# Patient Record
Sex: FEMALE | Race: WHITE | NOT HISPANIC OR LATINO | Employment: OTHER | URBAN - METROPOLITAN AREA
[De-identification: names, ages, dates, MRNs, and addresses within clinical notes are randomized per-mention and may not be internally consistent; named-entity substitution may affect disease eponyms.]

---

## 2023-09-20 ENCOUNTER — DOCUMENTATION (OUTPATIENT)
Dept: PRIMARY CARE | Facility: CLINIC | Age: 83
End: 2023-09-20
Payer: MEDICARE

## 2023-10-30 ENCOUNTER — DOCUMENTATION (OUTPATIENT)
Dept: PRIMARY CARE | Facility: CLINIC | Age: 83
End: 2023-10-30
Payer: MEDICARE

## 2023-10-30 NOTE — PROGRESS NOTES
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 Patient's healthcare needs ar met with staff at [facility], appreciates follow up and does not feel the need to continue services with Case Management at this time. Will close case.

## 2023-11-08 ENCOUNTER — OFFICE VISIT (OUTPATIENT)
Dept: OTOLARYNGOLOGY | Facility: CLINIC | Age: 83
End: 2023-11-08
Payer: MEDICARE

## 2023-11-08 VITALS — HEIGHT: 62 IN | BODY MASS INDEX: 22.08 KG/M2 | WEIGHT: 120 LBS

## 2023-11-08 DIAGNOSIS — H90.0 CONDUCTIVE HEARING LOSS, BILATERAL: ICD-10-CM

## 2023-11-08 DIAGNOSIS — H61.23 BILATERAL IMPACTED CERUMEN: Primary | ICD-10-CM

## 2023-11-08 PROCEDURE — 1036F TOBACCO NON-USER: CPT | Performed by: OTOLARYNGOLOGY

## 2023-11-08 PROCEDURE — 1159F MED LIST DOCD IN RCRD: CPT | Performed by: OTOLARYNGOLOGY

## 2023-11-08 PROCEDURE — 99213 OFFICE O/P EST LOW 20 MIN: CPT | Performed by: OTOLARYNGOLOGY

## 2023-11-08 PROCEDURE — 69210 REMOVE IMPACTED EAR WAX UNI: CPT | Performed by: OTOLARYNGOLOGY

## 2023-11-08 RX ORDER — GABAPENTIN 100 MG/1
CAPSULE ORAL
COMMUNITY

## 2023-11-08 RX ORDER — MULTIVITAMIN
TABLET ORAL EVERY 12 HOURS
COMMUNITY

## 2023-11-08 NOTE — PROGRESS NOTES
"Chief Complaint   Patient presents with    Follow-up     EP(2/17/23 W/ KERRI)- EAR CK, IRRIGATED TODAY      HPI:  Naomi Richardson is a 83 y.o. female ( Naomi) who complains of blocked ears.  Ongoing for at least several weeks.  Does have a history of wax impactions.  Denies any pain, drainage, vertigo.  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  Doing well without any changes in her medical history    PMH:  Past Medical History:   Diagnosis Date    Other specified health status     No known health problems     Past Surgical History:   Procedure Laterality Date    OTHER SURGICAL HISTORY  10/07/2021    Cholecystectomy    OTHER SURGICAL HISTORY  04/13/2021    Gallbladder surgery         Medications:     Current Outpatient Medications:     calcium carbonate-vitamin D3 (Calcium 600 + D,3,) 600 mg-10 mcg (400 unit) tablet, every 12 hours., Disp: , Rfl:     gabapentin (Neurontin) 100 mg capsule, Take by mouth., Disp: , Rfl:      Allergies:  Allergies   Allergen Reactions    Erythromycin Unknown        ROS:  Review of systems normal unless stated otherwise in the HPI and/or PMH.    Physical Exam:  Height 1.575 m (5' 2\"), weight 54.4 kg (120 lb). Body mass index is 21.95 kg/m².     GENERAL APPEARANCE: Well developed and well nourished.  Alert and oriented in no acute distress.  Normal vocal quality.      HEAD/FACE: No erythema or edema or facial tenderness.  Normal facial nerve function bilaterally.    EAR:       EXTERNAL: Normal pinnas and wax removed by flushing       MIDDLE EAR: Tympanic membranes intact and mobile with normal landmarks.  Middle ear space appears well aerated.       TUBE STATUS: N/A       MASTOID CAVITY: N/A       HEARING: Gross hearing assessment is within normal limits.      NOSE:       VISUALIZED USING: Anterior rhinoscopy with headlight and nasal speculum.       DORSUM: Midline, nontraumatic appearance.       MUCOSA: Normal-appearing.       SECRETIONS: Normal.       SEPTUM: Midline and " nonobstructing.       INFERIOR TURBINATES: Normal.       MIDDLE TURBINATES/MEATUS: N/A       BLEEDING: N/A         ORAL CAVITY/PHARYNX:       TEETH: Adequate dentition.       TONGUE: No mass or lesion.  Normal mobility.       FLOOR OF MOUTH: No mass or lesion.       PALATE: Normal hard palate, soft palate, and uvula.       OROPHARYNX: Normal without mass or lesion.       BUCCAL MUCOSA/GBS: Normal without mass or lesion.       LIPS: Normal.    LARYNX/HYPOPHARYNX/NASOPHARYNX: N/A    NECK: No palpable masses or abnormal adenopathy.  Trachea is midline.    THYROID: No thyromegaly or palpable nodule.    SALIVARY GLANDS: Normal bilateral parotid and submandibular glands by inspection and palpation.    TMJ's: Normal.    NEURO: Cranial nerve exam grossly normal bilaterally.       Assessment/Plan   Naomi was seen today for follow-up.  Diagnoses and all orders for this visit:  Bilateral impacted cerumen (Primary)  Conductive hearing loss, bilateral     Hearing better post ear cleaning.  Follow up if symptoms worsen or fail to improve.     Galileo Miguel MD

## 2024-04-02 ENCOUNTER — OFFICE VISIT (OUTPATIENT)
Dept: OTOLARYNGOLOGY | Facility: CLINIC | Age: 84
End: 2024-04-02
Payer: MEDICARE

## 2024-04-02 VITALS — WEIGHT: 120 LBS | HEIGHT: 62 IN | BODY MASS INDEX: 22.08 KG/M2

## 2024-04-02 DIAGNOSIS — H90.0 CONDUCTIVE HEARING LOSS, BILATERAL: ICD-10-CM

## 2024-04-02 DIAGNOSIS — H61.23 BILATERAL IMPACTED CERUMEN: Primary | ICD-10-CM

## 2024-04-02 PROCEDURE — 1159F MED LIST DOCD IN RCRD: CPT | Performed by: OTOLARYNGOLOGY

## 2024-04-02 PROCEDURE — 69210 REMOVE IMPACTED EAR WAX UNI: CPT | Performed by: OTOLARYNGOLOGY

## 2024-04-02 PROCEDURE — 1036F TOBACCO NON-USER: CPT | Performed by: OTOLARYNGOLOGY

## 2024-04-02 PROCEDURE — 99213 OFFICE O/P EST LOW 20 MIN: CPT | Performed by: OTOLARYNGOLOGY

## 2024-04-02 NOTE — PROGRESS NOTES
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  Ongoing for at least several weeks.  Does have a history of wax impactions.  Denies any pain, drainage, vertigo.  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  Doing well without any changes in her medical history    PMH:  Past Medical History:   Diagnosis Date    Other specified health status     No known health problems     Past Surgical History:   Procedure Laterality Date    OTHER SURGICAL HISTORY  10/07/2021    Cholecystectomy    OTHER SURGICAL HISTORY  04/13/2021    Gallbladder surgery         Medications:     Current Outpatient Medications:     calcium carbonate-vitamin D3 (Calcium 600 + D,3,) 600 mg-10 mcg (400 unit) tablet, every 12 hours., Disp: , Rfl:     gabapentin (Neurontin) 100 mg capsule, Take by mouth., Disp: , Rfl:      Allergies:  Allergies   Allergen Reactions    Erythromycin Unknown        ROS:  Review of systems normal unless stated otherwise in the HPI and/or PMH.    Physical Exam:  Height 1.575 m (5' 2\"), weight 54.4 kg (120 lb). Body mass index is 21.95 kg/m².     GENERAL APPEARANCE: Well developed and well nourished.  Alert and oriented in no acute distress.  Normal vocal quality.      HEAD/FACE: No erythema or edema or facial tenderness.  Normal facial nerve function bilaterally.    EAR:       EXTERNAL: Normal pinnas and bilateral obstructive cerumen impaction was removed by myself with instrumentation using the operating microscope.  Normal pinnas and external auditory canals after cleaning.       MIDDLE EAR: Tympanic membranes intact and mobile with normal landmarks.  Middle ear space appears well aerated.       TUBE STATUS: N/A       MASTOID CAVITY: N/A       HEARING: Gross hearing assessment is within normal limits.      NOSE:       VISUALIZED USING: Anterior rhinoscopy with headlight and " nasal speculum.       DORSUM: Midline, nontraumatic appearance.       MUCOSA: Normal-appearing.       SECRETIONS: Normal.       SEPTUM: Midline and nonobstructing.       INFERIOR TURBINATES: Normal.       MIDDLE TURBINATES/MEATUS: N/A       BLEEDING: N/A         ORAL CAVITY/PHARYNX:       TEETH: Adequate dentition.       TONGUE: No mass or lesion.  Normal mobility.       FLOOR OF MOUTH: No mass or lesion.       PALATE: Normal hard palate, soft palate, and uvula.       OROPHARYNX: Normal without mass or lesion.       BUCCAL MUCOSA/GBS: Normal without mass or lesion.       LIPS: Normal.    LARYNX/HYPOPHARYNX/NASOPHARYNX: N/A    NECK: No palpable masses or abnormal adenopathy.  Trachea is midline.    THYROID: No thyromegaly or palpable nodule.    SALIVARY GLANDS: Normal bilateral parotid and submandibular glands by inspection and palpation.    TMJ's: Normal.    NEURO: Cranial nerve exam grossly normal bilaterally.       Assessment/Plan   Naomi was seen today for follow-up.  Diagnoses and all orders for this visit:  Bilateral impacted cerumen (Primary)  Conductive hearing loss, bilateral       Hearing better post ear cleaning.  Follow-up with changes  Follow up in about 4 months (around 8/2/2024) for Recheck.     Galileo Miguel MD

## 2024-04-15 ENCOUNTER — LAB (OUTPATIENT)
Dept: LAB | Facility: LAB | Age: 84
End: 2024-04-15
Payer: MEDICARE

## 2024-04-15 ENCOUNTER — OFFICE VISIT (OUTPATIENT)
Dept: PRIMARY CARE | Facility: CLINIC | Age: 84
End: 2024-04-15
Payer: MEDICARE

## 2024-04-15 ENCOUNTER — HOSPITAL ENCOUNTER (OUTPATIENT)
Dept: RADIOLOGY | Facility: CLINIC | Age: 84
Discharge: HOME | End: 2024-04-15
Payer: MEDICARE

## 2024-04-15 VITALS
OXYGEN SATURATION: 93 % | HEART RATE: 71 BPM | SYSTOLIC BLOOD PRESSURE: 112 MMHG | DIASTOLIC BLOOD PRESSURE: 60 MMHG | WEIGHT: 119 LBS | BODY MASS INDEX: 21.77 KG/M2 | TEMPERATURE: 97.4 F

## 2024-04-15 DIAGNOSIS — M54.50 ACUTE LEFT-SIDED LOW BACK PAIN WITHOUT SCIATICA: Primary | ICD-10-CM

## 2024-04-15 DIAGNOSIS — R53.83 MALAISE AND FATIGUE: ICD-10-CM

## 2024-04-15 DIAGNOSIS — M54.50 ACUTE LEFT-SIDED LOW BACK PAIN WITHOUT SCIATICA: ICD-10-CM

## 2024-04-15 DIAGNOSIS — R53.81 MALAISE AND FATIGUE: ICD-10-CM

## 2024-04-15 LAB
ALBUMIN SERPL-MCNC: 4.2 G/DL (ref 3.5–5)
ALP BLD-CCNC: 68 U/L (ref 35–125)
ALT SERPL-CCNC: 23 U/L (ref 5–40)
ANION GAP SERPL CALC-SCNC: 12 MMOL/L
APPEARANCE UR: CLEAR
AST SERPL-CCNC: 27 U/L (ref 5–40)
BASOPHILS # BLD MANUAL: 0 X10*3/UL (ref 0–0.1)
BASOPHILS NFR BLD MANUAL: 0 %
BILIRUB SERPL-MCNC: 0.4 MG/DL (ref 0.1–1.2)
BILIRUB UR STRIP.AUTO-MCNC: NEGATIVE MG/DL
BUN SERPL-MCNC: 19 MG/DL (ref 8–25)
CALCIUM SERPL-MCNC: 10.2 MG/DL (ref 8.5–10.4)
CHLORIDE SERPL-SCNC: 99 MMOL/L (ref 97–107)
CO2 SERPL-SCNC: 29 MMOL/L (ref 24–31)
COLOR UR: YELLOW
CREAT SERPL-MCNC: 0.9 MG/DL (ref 0.4–1.6)
EGFRCR SERPLBLD CKD-EPI 2021: 64 ML/MIN/1.73M*2
EOSINOPHIL # BLD MANUAL: 0 X10*3/UL (ref 0–0.4)
EOSINOPHIL NFR BLD MANUAL: 0 %
ERYTHROCYTE [DISTWIDTH] IN BLOOD BY AUTOMATED COUNT: 11.9 % (ref 11.5–14.5)
GLUCOSE SERPL-MCNC: 96 MG/DL (ref 65–99)
GLUCOSE UR STRIP.AUTO-MCNC: NORMAL MG/DL
HCT VFR BLD AUTO: 41 % (ref 36–46)
HGB BLD-MCNC: 13.5 G/DL (ref 12–16)
IMM GRANULOCYTES # BLD AUTO: 0.01 X10*3/UL (ref 0–0.5)
IMM GRANULOCYTES NFR BLD AUTO: 0.2 % (ref 0–0.9)
KETONES UR STRIP.AUTO-MCNC: NEGATIVE MG/DL
LEUKOCYTE ESTERASE UR QL STRIP.AUTO: NEGATIVE
LYMPHOCYTES # BLD MANUAL: 1.16 X10*3/UL (ref 0.8–3)
LYMPHOCYTES NFR BLD MANUAL: 27 %
MCH RBC QN AUTO: 32.1 PG (ref 26–34)
MCHC RBC AUTO-ENTMCNC: 32.9 G/DL (ref 32–36)
MCV RBC AUTO: 97 FL (ref 80–100)
MONOCYTES # BLD MANUAL: 0.17 X10*3/UL (ref 0.05–0.8)
MONOCYTES NFR BLD MANUAL: 4 %
NEUTROPHILS # BLD MANUAL: 2.93 X10*3/UL (ref 1.6–5.5)
NEUTS BAND # BLD MANUAL: 0.09 X10*3/UL (ref 0–0.5)
NEUTS BAND NFR BLD MANUAL: 2 %
NEUTS SEG # BLD MANUAL: 2.84 X10*3/UL (ref 1.6–5)
NEUTS SEG NFR BLD MANUAL: 66 %
NITRITE UR QL STRIP.AUTO: NEGATIVE
NRBC BLD-RTO: 0 /100 WBCS (ref 0–0)
PH UR STRIP.AUTO: 5.5 [PH]
PLATELET # BLD AUTO: 193 X10*3/UL (ref 150–450)
POTASSIUM SERPL-SCNC: 4.3 MMOL/L (ref 3.4–5.1)
PROT SERPL-MCNC: 6.4 G/DL (ref 5.9–7.9)
PROT UR STRIP.AUTO-MCNC: NEGATIVE MG/DL
RBC # BLD AUTO: 4.21 X10*6/UL (ref 4–5.2)
RBC # UR STRIP.AUTO: NEGATIVE /UL
RBC MORPH BLD: NORMAL
SODIUM SERPL-SCNC: 140 MMOL/L (ref 133–145)
SP GR UR STRIP.AUTO: 1.02
TOTAL CELLS COUNTED BLD: 100
TSH SERPL DL<=0.05 MIU/L-ACNC: 1.29 MIU/L (ref 0.27–4.2)
UROBILINOGEN UR STRIP.AUTO-MCNC: NORMAL MG/DL
VARIANT LYMPHS # BLD MANUAL: 0.04 X10*3/UL (ref 0–0.3)
VARIANT LYMPHS NFR BLD: 1 %
VIT B12 SERPL-MCNC: 1831 PG/ML (ref 211–946)
WBC # BLD AUTO: 4.3 X10*3/UL (ref 4.4–11.3)

## 2024-04-15 PROCEDURE — 1159F MED LIST DOCD IN RCRD: CPT | Performed by: INTERNAL MEDICINE

## 2024-04-15 PROCEDURE — 1123F ACP DISCUSS/DSCN MKR DOCD: CPT | Performed by: INTERNAL MEDICINE

## 2024-04-15 PROCEDURE — 36415 COLL VENOUS BLD VENIPUNCTURE: CPT

## 2024-04-15 PROCEDURE — 80053 COMPREHEN METABOLIC PANEL: CPT

## 2024-04-15 PROCEDURE — 99214 OFFICE O/P EST MOD 30 MIN: CPT | Performed by: INTERNAL MEDICINE

## 2024-04-15 PROCEDURE — 82607 VITAMIN B-12: CPT

## 2024-04-15 PROCEDURE — 72110 X-RAY EXAM L-2 SPINE 4/>VWS: CPT

## 2024-04-15 PROCEDURE — 85007 BL SMEAR W/DIFF WBC COUNT: CPT

## 2024-04-15 PROCEDURE — 81003 URINALYSIS AUTO W/O SCOPE: CPT

## 2024-04-15 PROCEDURE — 85027 COMPLETE CBC AUTOMATED: CPT

## 2024-04-15 PROCEDURE — 84443 ASSAY THYROID STIM HORMONE: CPT

## 2024-04-15 PROCEDURE — 72110 X-RAY EXAM L-2 SPINE 4/>VWS: CPT | Performed by: RADIOLOGY

## 2024-04-15 PROCEDURE — 1158F ADVNC CARE PLAN TLK DOCD: CPT | Performed by: INTERNAL MEDICINE

## 2024-04-15 PROCEDURE — 1036F TOBACCO NON-USER: CPT | Performed by: INTERNAL MEDICINE

## 2024-04-15 PROCEDURE — 1126F AMNT PAIN NOTED NONE PRSNT: CPT | Performed by: INTERNAL MEDICINE

## 2024-04-15 RX ORDER — FERROUS SULFATE 325(65) MG
325 TABLET ORAL
COMMUNITY

## 2024-04-15 ASSESSMENT — ENCOUNTER SYMPTOMS
BACK PAIN: 1
BOWEL INCONTINENCE: 0
TINGLING: 0
FEVER: 0
NUMBNESS: 0
FATIGUE: 1
DYSURIA: 0
WEAKNESS: 0
PARESTHESIAS: 0
ABDOMINAL PAIN: 0

## 2024-04-15 ASSESSMENT — PATIENT HEALTH QUESTIONNAIRE - PHQ9
SUM OF ALL RESPONSES TO PHQ9 QUESTIONS 1 AND 2: 0
2. FEELING DOWN, DEPRESSED OR HOPELESS: NOT AT ALL
1. LITTLE INTEREST OR PLEASURE IN DOING THINGS: NOT AT ALL

## 2024-04-15 ASSESSMENT — PAIN SCALES - GENERAL: PAINLEVEL: 0-NO PAIN

## 2024-04-15 NOTE — PROGRESS NOTES
Subjective   Patient ID: Naomi Richardson is a 84 y.o. female who presents for Back Pain.    Back Pain  This is a new problem. The current episode started 1 to 4 weeks ago. The problem occurs intermittently. The problem is unchanged. The pain is present in the gluteal. The quality of the pain is described as aching. The pain does not radiate. The pain is The same all the time. The symptoms are aggravated by bending and position. Stiffness is present: when bending/kneeling to pray. Pertinent negatives include no abdominal pain, bladder incontinence, bowel incontinence, dysuria, fever, numbness, paresthesias, pelvic pain, tingling or weakness. She has tried nothing for the symptoms.        Review of Systems   Constitutional:  Positive for fatigue. Negative for fever.   Gastrointestinal:  Negative for abdominal pain and bowel incontinence.   Genitourinary:  Negative for bladder incontinence, dysuria and pelvic pain.   Musculoskeletal:  Positive for back pain.   Neurological:  Negative for tingling, weakness, numbness and paresthesias.       Objective   /60   Pulse 71   Temp 36.3 °C (97.4 °F)   Wt 54 kg (119 lb)   SpO2 93%   BMI 21.77 kg/m²     Physical Exam  Vitals reviewed.   Constitutional:       General: She is not in acute distress.     Appearance: Normal appearance. She is normal weight. She is not ill-appearing.   Cardiovascular:      Rate and Rhythm: Normal rate and regular rhythm.      Heart sounds: Normal heart sounds.   Pulmonary:      Effort: Pulmonary effort is normal.      Breath sounds: Normal breath sounds.   Abdominal:      General: Abdomen is flat. Bowel sounds are normal.      Palpations: Abdomen is soft.      Tenderness: There is no abdominal tenderness. There is left CVA tenderness.   Musculoskeletal:      Lumbar back: No swelling, edema, deformity, spasms, tenderness or bony tenderness. Normal range of motion.   Neurological:      General: No focal deficit present.      Mental Status: She  is alert and oriented to person, place, and time.   Psychiatric:         Mood and Affect: Mood normal.         Assessment/Plan   Diagnoses and all orders for this visit:  Acute left-sided low back pain without sciatica  -     Urinalysis with Reflex Culture and Microscopic; Future  -     XR lumbar spine complete 4+ views; Future  Malaise and fatigue  -     CBC and Auto Differential; Future  -     Comprehensive metabolic panel; Future  -     TSH; Future  -     Vitamin B12; Future  -     Urinalysis with Reflex Culture and Microscopic; Future

## 2024-04-18 ENCOUNTER — TELEPHONE (OUTPATIENT)
Dept: PRIMARY CARE | Facility: CLINIC | Age: 84
End: 2024-04-18
Payer: MEDICARE

## 2024-04-18 NOTE — TELEPHONE ENCOUNTER
Pt returned our call regarding her test results. Pt stated she will be home until 3 pm. Please call her.

## 2024-09-09 DIAGNOSIS — R20.2 PARESTHESIA OF SKIN: ICD-10-CM

## 2024-09-09 RX ORDER — GABAPENTIN 100 MG/1
100 CAPSULE ORAL 2 TIMES DAILY
Qty: 180 CAPSULE | Refills: 3 | Status: SHIPPED | OUTPATIENT
Start: 2024-09-09

## 2024-09-11 DIAGNOSIS — R20.2 PARESTHESIA OF SKIN: ICD-10-CM

## 2024-09-11 RX ORDER — GABAPENTIN 100 MG/1
100 CAPSULE ORAL 2 TIMES DAILY
Qty: 180 CAPSULE | Refills: 3 | OUTPATIENT
Start: 2024-09-11

## 2024-09-11 NOTE — TELEPHONE ENCOUNTER
Patient needs rf on gabapentin - [facility] - asking if you need to see her soon? She is almost out of rx

## 2025-01-27 ENCOUNTER — ANCILLARY PROCEDURE (OUTPATIENT)
Dept: URGENT CARE | Age: 85
End: 2025-01-27
Payer: MEDICARE

## 2025-01-27 ENCOUNTER — OFFICE VISIT (OUTPATIENT)
Dept: URGENT CARE | Age: 85
End: 2025-01-27
Payer: MEDICARE

## 2025-01-27 VITALS
RESPIRATION RATE: 20 BRPM | OXYGEN SATURATION: 96 % | DIASTOLIC BLOOD PRESSURE: 72 MMHG | SYSTOLIC BLOOD PRESSURE: 120 MMHG | HEART RATE: 100 BPM | TEMPERATURE: 97.7 F

## 2025-01-27 DIAGNOSIS — S59.901A INJURY OF RIGHT ELBOW, INITIAL ENCOUNTER: ICD-10-CM

## 2025-01-27 DIAGNOSIS — L03.113 CELLULITIS OF RIGHT UPPER EXTREMITY: Primary | ICD-10-CM

## 2025-01-27 PROCEDURE — 73080 X-RAY EXAM OF ELBOW: CPT | Mod: RIGHT SIDE | Performed by: PHYSICIAN ASSISTANT

## 2025-01-27 RX ORDER — CEPHALEXIN 500 MG/1
500 CAPSULE ORAL 3 TIMES DAILY
Qty: 21 CAPSULE | Refills: 0 | Status: SHIPPED | OUTPATIENT
Start: 2025-01-27 | End: 2025-02-03

## 2025-01-27 RX ORDER — SULFAMETHOXAZOLE AND TRIMETHOPRIM 800; 160 MG/1; MG/1
1 TABLET ORAL 2 TIMES DAILY
Qty: 10 TABLET | Refills: 0 | Status: SHIPPED | OUTPATIENT
Start: 2025-01-27 | End: 2025-01-27

## 2025-01-27 RX ORDER — SULFAMETHOXAZOLE AND TRIMETHOPRIM 800; 160 MG/1; MG/1
1 TABLET ORAL 2 TIMES DAILY
Qty: 10 TABLET | Refills: 0 | Status: SHIPPED | OUTPATIENT
Start: 2025-01-27 | End: 2025-02-01

## 2025-01-27 RX ORDER — CEPHALEXIN 500 MG/1
500 CAPSULE ORAL 3 TIMES DAILY
Qty: 21 CAPSULE | Refills: 0 | Status: SHIPPED | OUTPATIENT
Start: 2025-01-27 | End: 2025-01-27

## 2025-01-27 NOTE — PROGRESS NOTES
Subjective   Patient ID: Naomi Richardson is a 85 y.o. female. They present today with a chief complaint of Injury (X 2 weeks, RT forearm, red. Slide on ice. ).    History of Present Illness  Patient is a very pleasant 85-year-old white female, no significant past medical history, presented to clinic with chief complaint of right arm swelling.  Patient is about 2 weeks ago she slipped on some ice onto her bottom and hit her right arm on the ground.  States she recovered without difficulty however over the past couple of days she noticed increasing pain redness and swelling associated with the right forearm.  She does endorse bumping the extremity on a wall causing a small superficial abrasion to the distal forearm.  She denies any fever or chills.  No streaking.  No nausea vomiting or diarrhea.  No further complaints at this time.  She has not tried anything at home for her symptoms.      Injury      Past Medical History  Allergies as of 01/27/2025 - Reviewed 01/27/2025   Allergen Reaction Noted    Erythromycin Unknown 11/08/2023       (Not in a hospital admission)         Past Medical History:   Diagnosis Date    Other specified health status     No known health problems       Past Surgical History:   Procedure Laterality Date    OTHER SURGICAL HISTORY  10/07/2021    Cholecystectomy    OTHER SURGICAL HISTORY  04/13/2021    Gallbladder surgery        reports that she has never smoked. She has never used smokeless tobacco.    Review of Systems  Review of Systems                               Objective    Vitals:    01/27/25 1136   BP: 120/72   BP Location: Left arm   Patient Position: Sitting   BP Cuff Size: Adult   Pulse: 100   Resp: 20   Temp: 36.5 °C (97.7 °F)   TempSrc: Temporal   SpO2: 96%     No LMP recorded.    Physical Exam  Constitutional: Alert, oriented, cooperative, in no acute distress. Appears well nourished and well hydrated.    Head: Normocephalic, atraumatic    Skin: There is erythema with associated  induration or warmth over the ulnar aspect of the entire right forearm.  There is a small superficial abrasion over the distal ulnar aspect of the forearm.  There is no drainage.  There is no streaking.  Is neurovascular intact distally with cap refill less than 2 seconds full  think and 2+ radial pulse.  Otherwise intact, dry skin. No lesions, rash, petechiae, or purpura.    Eyes: PERRL, EOMs intact, conjunctiva pink with no erythema or exudates. No scleral icterus. Eyelids without lesions.    ENT: Hearing grossly intact. No external deformities. Tympanic membranes intact with visible landmarks. Nares patent, mucus membranes moist. Dentition without lesions. Pharynx clear, uvula midline.    Neck: Trachea midline, no lymphadenopathy. No meningismus.     Pulmonary: Lungs clear to auscultation bilaterally with good chest wall excursion. No rales, rhonchi, or wheezing. No accessory muscle use or stridor.     Cardiac: Regular rate and rhythm. Normal S1 and S2 with no murmur, rub, or gallop. No JVD, carotids without bruits. 2+ DP and PT pulses.     Abdomen: Soft, nontender, normoactive bowel sounds. No palpable organomegaly or mass. No rebound or guarding. No CVA tenderness.     Genitourinary: Exam deferred.    Musculoskeletal: Full range of motion in extremities. No pain, edema, or deformities. Peripheral pulses full and equal. No cyanosis, or clubbing.     Neurological: Cranial nerves II through XII are grossly intact. Normal sensation, no weakness, no focal findings identified.     Psychiatric: Appropriate mood and affect. Calm.  Procedures    Point of Care Test & Imaging Results from this visit    No results found.    Diagnostic study results (if any) were reviewed by Denis Palomo PA-C.    Assessment/Plan   Allergies, medications, history, and pertinent labs/EKGs/Imaging reviewed by Denis Palomo PA-C.     Medical Decision Making  Patient was seen eval in the clinic with complaint of pain swelling  and redness associated with the right upper extremity.  On exam patient is nontoxic very well-appearing respect comfortably no acute distress.  Vital signs are stable, afebrile.  Chest is clear, heart is regular, belly soft and nontender peer evaluation of the right upper extremity as above concerning for a developing cellulitic process.  X-ray imaging of the elbow was obtained given history of trauma which reveals no underlying acute osseous abnormalities.  Will treat the patient for cellulitis of the right upper extremity with a 7-day course of cephalexin 100 mg 3 times a day along with a 5-day course of Bactrim DS twice daily.  Advised to follow very closely with the primary care physician in the next week.  Breast imaging report to the ED if the redness is worsening in any way.  Reviewed my impression, plan, strict return versus report to ED precautions with the patient.  She expresses understanding and agreement plan of care.    Orders and Diagnoses  Diagnoses and all orders for this visit:  Cellulitis of right upper extremity  -     cephalexin (Keflex) 500 mg capsule; Take 1 capsule (500 mg) by mouth 3 times a day for 7 days.  -     sulfamethoxazole-trimethoprim (Bactrim DS) 800-160 mg tablet; Take 1 tablet by mouth 2 times a day for 5 days.  Injury of right elbow, initial encounter  -     XR elbow right 3+ views; Future        Medical Admin Record      Follow Up Instructions  No follow-ups on file.    Patient disposition: Home    Electronically signed by Denis Palomo PA-C  12:05 PM

## 2025-01-27 NOTE — PATIENT INSTRUCTIONS
Get a probiotic when you  prescriptions from the pharmacy    Please report to the ED if symptoms continue to worsen in any way

## 2025-01-31 ENCOUNTER — HOSPITAL ENCOUNTER (EMERGENCY)
Facility: HOSPITAL | Age: 85
Discharge: HOME | End: 2025-01-31
Attending: EMERGENCY MEDICINE
Payer: MEDICARE

## 2025-01-31 ENCOUNTER — HOSPITAL ENCOUNTER (EMERGENCY)
Facility: HOSPITAL | Age: 85
Discharge: HOME | End: 2025-01-31
Attending: STUDENT IN AN ORGANIZED HEALTH CARE EDUCATION/TRAINING PROGRAM
Payer: MEDICARE

## 2025-01-31 VITALS
SYSTOLIC BLOOD PRESSURE: 96 MMHG | WEIGHT: 122 LBS | OXYGEN SATURATION: 96 % | BODY MASS INDEX: 21.62 KG/M2 | RESPIRATION RATE: 18 BRPM | DIASTOLIC BLOOD PRESSURE: 82 MMHG | TEMPERATURE: 97.7 F | HEART RATE: 89 BPM | HEIGHT: 63 IN

## 2025-01-31 VITALS
WEIGHT: 122 LBS | BODY MASS INDEX: 21.62 KG/M2 | HEIGHT: 63 IN | TEMPERATURE: 97.5 F | OXYGEN SATURATION: 96 % | SYSTOLIC BLOOD PRESSURE: 135 MMHG | HEART RATE: 84 BPM | DIASTOLIC BLOOD PRESSURE: 72 MMHG | RESPIRATION RATE: 18 BRPM

## 2025-01-31 DIAGNOSIS — R04.0 EPISTAXIS: Primary | ICD-10-CM

## 2025-01-31 PROCEDURE — 99282 EMERGENCY DEPT VISIT SF MDM: CPT | Performed by: EMERGENCY MEDICINE

## 2025-01-31 PROCEDURE — 30901 CONTROL OF NOSEBLEED: CPT | Mod: 50 | Performed by: STUDENT IN AN ORGANIZED HEALTH CARE EDUCATION/TRAINING PROGRAM

## 2025-01-31 PROCEDURE — 2500000005 HC RX 250 GENERAL PHARMACY W/O HCPCS: Performed by: STUDENT IN AN ORGANIZED HEALTH CARE EDUCATION/TRAINING PROGRAM

## 2025-01-31 PROCEDURE — 99282 EMERGENCY DEPT VISIT SF MDM: CPT | Performed by: STUDENT IN AN ORGANIZED HEALTH CARE EDUCATION/TRAINING PROGRAM

## 2025-01-31 PROCEDURE — 2500000001 HC RX 250 WO HCPCS SELF ADMINISTERED DRUGS (ALT 637 FOR MEDICARE OP): Performed by: EMERGENCY MEDICINE

## 2025-01-31 RX ORDER — OXYMETAZOLINE HCL 0.05 %
2 SPRAY, NON-AEROSOL (ML) NASAL ONCE
Status: COMPLETED | OUTPATIENT
Start: 2025-01-31 | End: 2025-01-31

## 2025-01-31 RX ORDER — SILVER NITRATE 38.21; 12.74 MG/1; MG/1
STICK TOPICAL ONCE
Status: COMPLETED | OUTPATIENT
Start: 2025-01-31 | End: 2025-01-31

## 2025-01-31 RX ADMIN — OXYMETAZOLINE HYDROCHLORIDE 2 SPRAY: 0.5 SPRAY NASAL at 10:34

## 2025-01-31 RX ADMIN — SILVER NITRATE APPLICATORS 1 APPLICATOR: 25; 75 STICK TOPICAL at 05:59

## 2025-01-31 ASSESSMENT — PAIN SCALES - GENERAL
PAINLEVEL_OUTOF10: 0 - NO PAIN
PAINLEVEL_OUTOF10: 0 - NO PAIN

## 2025-01-31 ASSESSMENT — PAIN - FUNCTIONAL ASSESSMENT
PAIN_FUNCTIONAL_ASSESSMENT: 0-10
PAIN_FUNCTIONAL_ASSESSMENT: 0-10

## 2025-01-31 ASSESSMENT — COLUMBIA-SUICIDE SEVERITY RATING SCALE - C-SSRS
6. HAVE YOU EVER DONE ANYTHING, STARTED TO DO ANYTHING, OR PREPARED TO DO ANYTHING TO END YOUR LIFE?: NO
1. IN THE PAST MONTH, HAVE YOU WISHED YOU WERE DEAD OR WISHED YOU COULD GO TO SLEEP AND NOT WAKE UP?: NO
2. HAVE YOU ACTUALLY HAD ANY THOUGHTS OF KILLING YOURSELF?: NO

## 2025-01-31 NOTE — ED TRIAGE NOTES
Patient arrived to the ED from home via personal vehicle with a chief complaint of epistaxis. Patient reports that she was here this am for same complaint. Patient ambulated independently into triage, A&Ox4.

## 2025-01-31 NOTE — PROGRESS NOTES
Attestation/Supervisory note for TIANNA Ackerman      The patient is an 85-year-old female presenting to the emergency department for evaluation of epistaxis.  The patient was reportedly seen in the emergency room early this morning because she had bleeding from both of her naris.  She reportedly had it cauterized by Dr. Kelley and was discharged to home with instructions to follow-up with ENT.  She reportedly went home and just prior to arrival she had some recurrence of the bleeding so she returned to the emergency room.  She states that she fell about 2 weeks ago but did not injure her nose.  She did not have any other injury or trauma at the time.  She does not have any headache or visual changes.  No chest pain or shortness of breath.  No abdominal pain.  No nausea vomiting.  No diarrhea or constipation but no urinary complaints.  No fever or chills.  No cough or congestion.  No focal weakness or numbness.  No use of blood thinners.  All pertinent positives and negatives are recorded above.  All other systems reviewed and otherwise negative.  Vital signs within normal limits.  Physical exam with a well-nourished well-developed female in no acute distress.  HEENT exam with a scant amount of blood in both of her nares but otherwise within normal limits.  She has no evidence of airway compromise or respiratory distress.  Abdominal exam is benign.  She does not have any gross motor, neurologic or vascular deficits on exam.  Pulses are equal bilaterally.      Afrin was instilled bilaterally and a nasal clamp was applied.  The patient was observed in the emergency room but did not have any recurrence of her bleeding.      The patient was released in good condition.  She will follow-up with her primary care physician as needed.  She will follow-up with her ENT within 1 to 2 days for further management of her current symptoms.  She will return to the emergency department sooner with worsening of symptoms or onset of new  symptoms.      Impression/diagnosis:  1.  Epistaxis, recurrent, bilateral      I personally saw the patient and made/approve the management plan and take responsibility for the patient management.      I personally discussed the patient's management with the patient      Zayda Thompson MD

## 2025-01-31 NOTE — DISCHARGE INSTRUCTIONS
Please use Afrin and the nasal clamps as needed for recurrent nosebleeds and use humidifiers and Vaseline to hydrate the nose.  Present back to ER if you develop any recurrent nosebleeds that you are unable to stop yourself with Afrin, nasal clamps and direct pressure.  Follow-up with the ENT specialist.    It is important to remember that your care does not end here and you must continue to monitor your condition closely. Please return to the emergency department for any worsening or concerning signs or symptoms as directed by our conversations and the discharge instructions. If you do not have a doctor please contact the referral number on your discharge instructions. Please contact any physician specialists provided in your discharge notes as it is very important to follow up with them regarding your condition. If you are unable to reach the physicians provided, please come back to the Emergency Department at any time.

## 2025-01-31 NOTE — DISCHARGE INSTRUCTIONS
Follow-up with your ear nose and throat doctor, you should call the office and make an appointment for further evaluation and management of nosebleeds.  Return to the ED for any nosebleed that you cannot get this time.  For acute management of nosebleeds you should pinch your nose, lean forward and hold pressure for 15 minutes without letting go.  Blow out any blood clots and see if you are still having nosebleed.  If you are you should repeat pinching off your nose and leaning forward for 15 minutes.  If this does not stop the bleeding you should return to the emergency department for further management.

## 2025-01-31 NOTE — ED PROVIDER NOTES
HPI   Chief Complaint   Patient presents with    Epistaxis (Nose Bleed)       HPI  Patient is an 85-year-old female presenting for evaluation of epistaxis.  Patient states that she was seen in the ER earlier for epistaxis and had both of her nostrils cauterized but states that the bleeding did start back up prior to coming into the ER this time.  She states the area is very dry where she lives and feels this is contributing to her nosebleeds.  She was instructed to come back to the ER if she has recurrence of nosebleeds.  She states she does have an ENT doctor but has not been able to follow-up since her previous nosebleed.  Denies any use of blood thinners.  Denies injury or trauma to the nose.  Otherwise has no acute complaints.      Patient History   Past Medical History:   Diagnosis Date    Other specified health status     No known health problems     Past Surgical History:   Procedure Laterality Date    OTHER SURGICAL HISTORY  10/07/2021    Cholecystectomy    OTHER SURGICAL HISTORY  04/13/2021    Gallbladder surgery     No family history on file.  Social History     Tobacco Use    Smoking status: Never    Smokeless tobacco: Never   Substance Use Topics    Alcohol use: Not on file    Drug use: Not on file       Physical Exam   ED Triage Vitals [01/31/25 1017]   Temperature Heart Rate Respirations BP   36.4 °C (97.5 °F) 93 18 131/78      Pulse Ox Temp Source Heart Rate Source Patient Position   97 % Temporal -- --      BP Location FiO2 (%)     -- --       Physical Exam  Vitals and nursing note reviewed.   Constitutional:       General: She is not in acute distress.  HENT:      Nose:      Comments: Excoriations to the medial aspect of bilateral nostrils with evidence of cauterization bilaterally, no active bleeding present     Mouth/Throat:      Comments: No active bleeding in the oropharynx  Neurological:      Mental Status: She is alert.           ED Course & MDM   ED Course as of 02/02/25 0631   Fri Jan 31,  2025 1217 Patient was able to ambulate, nosebleed has stopped and has not recurred during the visit.  Stable for discharge. [JJ]      ED Course User Index  [JJ] Sherrell Ackerman PA-C         Diagnoses as of 02/02/25 0631   Epistaxis                 No data recorded     Ritu Coma Scale Score: 15 (01/31/25 1035 : Pamela Roblero RN)                           Medical Decision Making  Parts of this chart have been completed using voice recognition software. Please excuse any errors of transcription.  My thought process and reason for plan has been formulated from the time that I saw the patient until the time of disposition and is not specific to one specific moment during their visit and furthermore my MDM encompasses this entire chart and not only this text box.      HPI: Detailed above.    Exam: A medically appropriate exam performed, outlined above, given the known history and presentation.    History obtained from: Patient    Medications given during visit:  Medications   oxymetazoline (Afrin) 0.05 % nasal spray 2 spray (2 sprays Each Nostril Given 1/31/25 1034)        Diagnostic/tests  Labs Reviewed - No data to display   No orders to display        Considerations/further MDM:  Patient is a 85-year-old female presenting for evaluation of epistaxis    Patient is well-appearing in no apparent distress during the visit.  HEENT exam is with a scant amount of blood in the medial aspect of anterior nares but no active bleeding is identified.  Afrin was instilled to the nares bilaterally and a nasal clamp was applied.  The patient was observed in the emergency room and was ambulated by nursing staff without recurrence of her epistaxis.  She is released in good condition with instruction to follow-up with primary care and also her ENT specialist for further management of her current symptoms.  Also encouraged rest, adequate hydration, humidifiers and Vaseline for hydration of the nostrils.  Home management of  epistaxis was discussed and patient was released with Afrin.  Released in good condition.      Procedure  Procedures     Sherrell Ackerman PA-C  02/02/25 0631

## 2025-01-31 NOTE — ED PROVIDER NOTES
HPI   Chief Complaint   Patient presents with    Epistaxis (Nose Bleed)     Pt present to the ED with c/o of nosebleed. Pt stated that she was on the toilet when she suddenly started bleeding from her nose. Pt states that she has a hx of nosebleed 2 months ago and she was able to stop it without coming to the hospital. Pt state she fell 2 weeks ago and was placed on keflex because her right arm was red.       This is an 85-year-old female presenting to the ED for evaluation of epistaxis.  She has a history of nosebleeds occurring a couple of times in the past 2 months since the heat went on.  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  She has used Vaseline a couple of times to try to keep the tissue moist but has not been using it recently.  She had an episode where her nose was bleeding for about 30 minutes this morning and she was able to get it to stop on her own so she came to the ED.  It has since stopped bleeding, she states she has never had a nosebleed lasting this long.  She denies any nasal trauma, any recent illness including cough or congestion, sore throat associated with her nosebleed.  She is not sure if the bleeding was out of 1 side or both sides this morning.      History provided by:  Patient   used: No            Patient History   Past Medical History:   Diagnosis Date    Other specified health status     No known health problems     Past Surgical History:   Procedure Laterality Date    OTHER SURGICAL HISTORY  10/07/2021    Cholecystectomy    OTHER SURGICAL HISTORY  04/13/2021    Gallbladder surgery     No family history on file.  Social History     Tobacco Use    Smoking status: Never    Smokeless tobacco: Never   Substance Use Topics    Alcohol use: Not on file    Drug use: Not on file       Physical Exam   ED Triage Vitals [01/31/25 0538]   Temperature Heart Rate Respirations BP   36.5 °C (97.7  °F) 89 18 96/82      Pulse Ox Temp Source Heart Rate Source Patient Position   96 % Oral Monitor --      BP Location FiO2 (%)     -- --       Physical Exam  General: well developed, well nourished 85-year-old female who is awake and alert, in no apparent distress  HENT: normocephalic, atraumatic. Pharynx without erythema or exudates, uvula midline.  Excoriations to the nasal septum on both sides, minimal on the right, larger on the left.  No active bleeding currently.  Psych: appropriate mood and affect, cooperative with exam  Skin: warm, dry, without evidence of rash or abrasions    ED Course & MDM   Diagnoses as of 01/31/25 0607   Epistaxis                 No data recorded     [place] Coma Scale Score: 15 (01/31/25 0538 : Ozzy Thomas RN)                           Medical Decision Making  She is in no acute distress in the ED.  She has excoriations to the nasal septum bilaterally, worse on the left side.  It is likely that this is due to the dry air in the place where she is living when he comes on since she has been having trouble with it on and off for the past couple of weeks and she has excoriations to both sides of the septum.  Verbal consent obtained for cauterization to prevent recurrent epistaxis.  Patient does have an ENT specialist that she sees, referred to follow-up as an outpatient after this procedure was performed today.  Advised to continue using Vaseline for hydration and is a protective barrier to prevent nosebleeds in the future when he comes on.  Patient tolerated the procedure well with no recurrence of bleeding.  She was discharged in improved condition.    Procedure  Epistaxis Management    Performed by: Denis Kelley DO  Authorized by: Denis Kelley DO    Consent:     Consent obtained:  Verbal    Consent given by:  Patient    Risks, benefits, and alternatives were discussed: yes      Risks discussed:  Bleeding, infection, nasal injury and pain    Alternatives discussed:   Delayed treatment, referral and observation  Universal protocol:     Procedure explained and questions answered to patient or proxy's satisfaction: yes      Relevant documents present and verified: yes      Test results available: yes      Imaging studies available: yes      Required blood products, implants, devices, and special equipment available: yes      Site/side marked: yes      Immediately prior to procedure, a time out was called: yes      Patient identity confirmed:  Verbally with patient  Anesthesia:     Anesthesia method:  None  Procedure details:     Treatment site:  R septum and L septum    Treatment method:  Silver nitrate    Treatment complexity:  Limited    Treatment episode: initial    Post-procedure details:     Assessment:  Bleeding stopped    Procedure completion:  Tolerated well, no immediate complications       Denis Kelley DO  01/31/25 0608

## 2025-02-04 ENCOUNTER — APPOINTMENT (OUTPATIENT)
Dept: OTOLARYNGOLOGY | Facility: CLINIC | Age: 85
End: 2025-02-04
Payer: MEDICARE

## 2025-02-07 ENCOUNTER — APPOINTMENT (OUTPATIENT)
Dept: OTOLARYNGOLOGY | Facility: CLINIC | Age: 85
End: 2025-02-07
Payer: MEDICARE

## 2025-02-10 ENCOUNTER — APPOINTMENT (OUTPATIENT)
Dept: OTOLARYNGOLOGY | Facility: CLINIC | Age: 85
End: 2025-02-10
Payer: MEDICARE

## 2025-02-10 DIAGNOSIS — H61.23 BILATERAL IMPACTED CERUMEN: ICD-10-CM

## 2025-02-10 DIAGNOSIS — H90.0 CONDUCTIVE HEARING LOSS, BILATERAL: ICD-10-CM

## 2025-02-10 DIAGNOSIS — H93.8X3 SENSATION OF FULLNESS IN BOTH EARS: ICD-10-CM

## 2025-02-10 DIAGNOSIS — R04.0 EPISTAXIS: Primary | ICD-10-CM

## 2025-02-10 PROCEDURE — 1159F MED LIST DOCD IN RCRD: CPT | Performed by: OTOLARYNGOLOGY

## 2025-02-10 PROCEDURE — 99214 OFFICE O/P EST MOD 30 MIN: CPT | Performed by: OTOLARYNGOLOGY

## 2025-02-10 PROCEDURE — 30903 CONTROL OF NOSEBLEED: CPT

## 2025-02-10 PROCEDURE — 69210 REMOVE IMPACTED EAR WAX UNI: CPT

## 2025-02-10 PROCEDURE — 1036F TOBACCO NON-USER: CPT | Performed by: OTOLARYNGOLOGY

## 2025-02-10 NOTE — PROGRESS NOTES
Chief Complaint   Patient presents with    Epistaxis (Nose Bleed)     LOV: 4/2024 NOSE BLEEDS, PACKING REMOVAL, NOT ON BLOOD THINNERS     HPI:  Naomi Richardson is a 85 y.o. female presents for follow-up after squad ride and ED treatment of EpiFix that with left-sided nasal balloon.  She had two emergency room visits for epistaxis also on 1/31/2025, they were able to control the bleeding with pressure no packing needed at those times.  She reports she has had many of the episodes of left-sided epistaxis since that [facility] [employer].  She denies blood thinner aspirin or ibuprofen use.  Also denies headaches  and vision changes. additional complaints today for bilateral ear fullness and decreased hearing, she reports she has an appointment for this next month    PMH:  Past Medical History:   Diagnosis Date    Other specified health status     No known health problems     Past Surgical History:   Procedure Laterality Date    OTHER SURGICAL HISTORY  10/07/2021    Cholecystectomy    OTHER SURGICAL HISTORY  04/13/2021    Gallbladder surgery         Medications:     Current Outpatient Medications:     calcium carbonate-vitamin D3 (Calcium 600 + D,3,) 600 mg-10 mcg (400 unit) tablet, every 12 hours., Disp: , Rfl:     ferrous sulfate, 325 mg ferrous sulfate, tablet, Take 1 tablet (325 mg) by mouth once daily with breakfast., Disp: , Rfl:     gabapentin (Neurontin) 100 mg capsule, TAKE 1 CAPSULE BY MOUTH TWICE A DAY, Disp: 180 capsule, Rfl: 3    multivitamin with minerals iron-free (multivit-iron-minerals-folic acid), Take 1 tablet by mouth once daily., Disp: , Rfl:      Allergies:  Allergies   Allergen Reactions    Erythromycin Unknown        ROS:  Review of systems normal unless stated otherwise in the HPI and/or PMH.    Physical Exam:       GENERAL APPEARANCE: Well developed and well nourished.  Alert and oriented in no acute distress.  Normal vocal quality.      HEAD/FACE: No erythema or edema or facial tenderness.   Normal facial nerve function bilaterally.    EAR:       EXTERNAL: Obstructing wax removed with suction and alligator underneath the microscope.  Normal pinnas and external auditory canals without lesion.        MIDDLE EAR: Tympanic membranes intact and mobile with normal landmarks.  Middle ear space appears well aerated.       TUBE STATUS: N/A       MASTOID CAVITY: N/A       HEARING: Gross hearing assessment is within normal limits.      NOSE:       VISUALIZED USING: Anterior rhinoscopy with headlight and nasal speculum.       DORSUM: Midline, nontraumatic appearance.       MUCOSA: Normal-appearing.       SECRETIONS: Normal.       SEPTUM: Midline and nonobstructing.       INFERIOR TURBINATES: Normal.       MIDDLE TURBINATES/MEATUS: N/A       BLEEDING: N/A         ORAL CAVITY/PHARYNX:       TEETH: Adequate dentition.       TONGUE: No mass or lesion.  Normal mobility.       FLOOR OF MOUTH: No mass or lesion.       PALATE: Normal hard palate, soft palate, and uvula.       OROPHARYNX: Normal without mass or lesion.       BUCCAL MUCOSA/GBS: Normal without mass or lesion.       LIPS: Normal.    LARYNX/HYPOPHARYNX/NASOPHARYNX: N/A    NECK: No palpable masses or abnormal adenopathy.  Trachea is midline.    THYROID: No thyromegaly or palpable nodule.    SALIVARY GLANDS: Normal bilateral parotid and submandibular glands by inspection and palpation.    TMJ's: Normal.    NEURO: Cranial nerve exam grossly normal bilaterally.     Procedure: Explained procedure, received verbal consent, Xylocaine and decongestant applied after epistaxis site identified with anterior rhinoscopy and headlight at left Kiesselbach's plexus. Cauterized vessel without difficulty patient tolerated well.      Assessment/Plan   Naomi was seen today for epistaxis (nose bleed).  Diagnoses and all orders for this visit:  Epistaxis (Primary)  Bilateral impacted cerumen  Sensation of fullness in both ears  Conductive hearing loss, bilateral     Discussed  epistaxis control at home.  Home-going instructions for post cauterization provided-no blowing, snorting, rubbing , picking or heavy lifting x 7 days.  I have asked her to use triple antibiotic and eraser size dollop to the left nares twice a day x 7 days, She will call with any epistaxis.  I provided this in writing. Reports hearing is better after impacted cerumen removed.  She will return in 6 months for an ear cleaning and cancel her existing appointment for next month   No follow-ups on file.     Marine Jaquez, APRN-CNP

## 2025-03-21 ENCOUNTER — APPOINTMENT (OUTPATIENT)
Dept: OTOLARYNGOLOGY | Facility: CLINIC | Age: 85
End: 2025-03-21
Payer: MEDICARE

## 2025-04-04 ENCOUNTER — OFFICE VISIT (OUTPATIENT)
Dept: PRIMARY CARE | Facility: CLINIC | Age: 85
End: 2025-04-04
Payer: MEDICARE

## 2025-04-04 VITALS
RESPIRATION RATE: 18 BRPM | OXYGEN SATURATION: 97 % | SYSTOLIC BLOOD PRESSURE: 130 MMHG | TEMPERATURE: 97.6 F | BODY MASS INDEX: 21.09 KG/M2 | DIASTOLIC BLOOD PRESSURE: 80 MMHG | HEART RATE: 95 BPM | WEIGHT: 119 LBS | HEIGHT: 63 IN

## 2025-04-04 DIAGNOSIS — R21 RASH: Primary | ICD-10-CM

## 2025-04-04 PROCEDURE — 1159F MED LIST DOCD IN RCRD: CPT | Performed by: NURSE PRACTITIONER

## 2025-04-04 PROCEDURE — 99214 OFFICE O/P EST MOD 30 MIN: CPT | Performed by: NURSE PRACTITIONER

## 2025-04-04 PROCEDURE — 1126F AMNT PAIN NOTED NONE PRSNT: CPT | Performed by: NURSE PRACTITIONER

## 2025-04-04 PROCEDURE — 1036F TOBACCO NON-USER: CPT | Performed by: NURSE PRACTITIONER

## 2025-04-04 RX ORDER — CLOTRIMAZOLE AND BETAMETHASONE DIPROPIONATE 10; .64 MG/G; MG/G
1 CREAM TOPICAL 2 TIMES DAILY
Qty: 30 G | Refills: 0 | Status: SHIPPED | OUTPATIENT
Start: 2025-04-04 | End: 2025-04-04

## 2025-04-04 RX ORDER — CLOTRIMAZOLE AND BETAMETHASONE DIPROPIONATE 10; .64 MG/G; MG/G
1 CREAM TOPICAL 2 TIMES DAILY
Qty: 30 G | Refills: 3 | Status: SHIPPED | OUTPATIENT
Start: 2025-04-04 | End: 2025-04-18

## 2025-04-04 ASSESSMENT — ENCOUNTER SYMPTOMS
RESPIRATORY NEGATIVE: 1
CARDIOVASCULAR NEGATIVE: 1
CONSTITUTIONAL NEGATIVE: 1

## 2025-04-04 ASSESSMENT — PAIN SCALES - GENERAL: PAINLEVEL_OUTOF10: 0-NO PAIN

## 2025-04-04 ASSESSMENT — PATIENT HEALTH QUESTIONNAIRE - PHQ9
2. FEELING DOWN, DEPRESSED OR HOPELESS: NOT AT ALL
1. LITTLE INTEREST OR PLEASURE IN DOING THINGS: NOT AT ALL
SUM OF ALL RESPONSES TO PHQ9 QUESTIONS 1 AND 2: 0

## 2025-04-04 NOTE — PROGRESS NOTES
"Chief Complaint  Naomi Richardson is a 85 y.o. female presenting for \"Rash (Dr Aldana pt- pt states she has a rash on her neck that wakes her up and has itching on back towards the left side, no pain/Has applied hydrocortisone cream to neck).\"    HPI     Naomi Richardson is a 85 y.o. female presenting for a rash on her neck on the left side, no new products      Past Medical History  There are no active problems to display for this patient.       Medications  Current Outpatient Medications   Medication Instructions    calcium carbonate-vitamin D3 (Calcium 600 + D,3,) 600 mg-10 mcg (400 unit) tablet Every 12 hours    clotrimazole-betamethasone (Lotrisone) cream 1 Application, Topical, 2 times daily    ferrous sulfate 325 mg, Daily with breakfast    gabapentin (NEURONTIN) 100 mg, oral, 2 times daily    multivitamin with minerals iron-free (multivit-iron-minerals-folic acid) 1 tablet, Daily        Surgical History  She has a past surgical history that includes Other surgical history (10/07/2021) and Other surgical history (04/13/2021).     Social History  She reports that she has never smoked. She has never been exposed to tobacco smoke. She has never used smokeless tobacco. She reports that she does not drink alcohol and does not use drugs.    Family History  No family history on file.     Allergies  Aspirin and Erythromycin    ROS  Review of Systems   Constitutional: Negative.    Respiratory: Negative.     Cardiovascular: Negative.    Skin:  Positive for rash.        Last Recorded Vitals  /80 (BP Location: Left arm, Patient Position: Sitting, BP Cuff Size: Small adult)   Pulse 95   Temp 36.4 °C (97.6 °F)   Resp 18   Wt 54 kg (119 lb)   SpO2 97%     Physical Exam  Vitals and nursing note reviewed.   Constitutional:       Appearance: Normal appearance.   Cardiovascular:      Rate and Rhythm: Normal rate and regular rhythm.      Pulses: Normal pulses.      Heart sounds: Normal heart sounds.   Skin:     Findings: " Rash present.   Neurological:      Mental Status: She is alert.         Relevant Results      Assessment/Plan   Naomi was seen today for rash.  Diagnoses and all orders for this visit:  Rash (Primary)  -     Discontinue: clotrimazole-betamethasone (Lotrisone) cream; Apply 1 Application topically 2 times a day for 14 days.  -     clotrimazole-betamethasone (Lotrisone) cream; Apply 1 Application topically 2 times a day for 14 days.          COUNSELING      Medication education:              Education:  The patient is counseled regarding potential side-effects of any and all new medications             Understanding:  Patient expressed understanding             Adherence:  No barriers to adherence identified        Lisa Olivas, APRN-CNP

## 2025-05-13 ENCOUNTER — OFFICE VISIT (OUTPATIENT)
Dept: PRIMARY CARE | Facility: CLINIC | Age: 85
End: 2025-05-13
Payer: MEDICARE

## 2025-05-13 VITALS
BODY MASS INDEX: 21.26 KG/M2 | DIASTOLIC BLOOD PRESSURE: 70 MMHG | HEART RATE: 91 BPM | WEIGHT: 120 LBS | SYSTOLIC BLOOD PRESSURE: 114 MMHG | TEMPERATURE: 96.4 F | OXYGEN SATURATION: 95 %

## 2025-05-13 DIAGNOSIS — J30.2 SEASONAL ALLERGIES: ICD-10-CM

## 2025-05-13 DIAGNOSIS — K25.4 CHRONIC GASTRIC ULCER WITH HEMORRHAGE: ICD-10-CM

## 2025-05-13 DIAGNOSIS — J01.00 ACUTE NON-RECURRENT MAXILLARY SINUSITIS: ICD-10-CM

## 2025-05-13 DIAGNOSIS — Z00.00 ADULT GENERAL MEDICAL EXAM: Primary | ICD-10-CM

## 2025-05-13 DIAGNOSIS — E78.2 MIXED HYPERLIPIDEMIA: ICD-10-CM

## 2025-05-13 DIAGNOSIS — E55.9 VITAMIN D DEFICIENCY: ICD-10-CM

## 2025-05-13 PROBLEM — E78.5 HYPERLIPIDEMIA: Status: ACTIVE | Noted: 2025-05-13

## 2025-05-13 PROCEDURE — 99215 OFFICE O/P EST HI 40 MIN: CPT | Performed by: INTERNAL MEDICINE

## 2025-05-13 PROCEDURE — 1159F MED LIST DOCD IN RCRD: CPT | Performed by: INTERNAL MEDICINE

## 2025-05-13 PROCEDURE — 99397 PER PM REEVAL EST PAT 65+ YR: CPT | Mod: 25 | Performed by: INTERNAL MEDICINE

## 2025-05-13 PROCEDURE — G0439 PPPS, SUBSEQ VISIT: HCPCS | Performed by: INTERNAL MEDICINE

## 2025-05-13 PROCEDURE — 99397 PER PM REEVAL EST PAT 65+ YR: CPT | Performed by: INTERNAL MEDICINE

## 2025-05-13 PROCEDURE — 1125F AMNT PAIN NOTED PAIN PRSNT: CPT | Performed by: INTERNAL MEDICINE

## 2025-05-13 RX ORDER — LORATADINE 10 MG/1
10 TABLET ORAL DAILY
Qty: 30 TABLET | Refills: 2 | Status: SHIPPED | OUTPATIENT
Start: 2025-05-13 | End: 2025-08-11

## 2025-05-13 RX ORDER — AMOXICILLIN AND CLAVULANATE POTASSIUM 875; 125 MG/1; MG/1
875 TABLET, FILM COATED ORAL 2 TIMES DAILY
Qty: 14 TABLET | Refills: 0 | Status: SHIPPED | OUTPATIENT
Start: 2025-05-13 | End: 2025-05-20

## 2025-05-13 ASSESSMENT — PAIN SCALES - GENERAL: PAINLEVEL_OUTOF10: 5

## 2025-05-13 ASSESSMENT — PATIENT HEALTH QUESTIONNAIRE - PHQ9
1. LITTLE INTEREST OR PLEASURE IN DOING THINGS: NOT AT ALL
SUM OF ALL RESPONSES TO PHQ9 QUESTIONS 1 AND 2: 0
2. FEELING DOWN, DEPRESSED OR HOPELESS: NOT AT ALL

## 2025-05-13 NOTE — PROGRESS NOTES
Outpatient Visit Note    Chief Complaint   Patient presents with    Nasal Congestion     Coughing and soreness in throat       HPI:  Naomi Richardson is a 85 y.o. female here  for a Medicare Wellness Visit.  Pt is also c/o sinus pressure, postnasal drip, yellow rhinorrhea and throat discomfort for the past couple of weeks. She has been using OTC Sudafed with minimal relief.     Health Maintenance  Immunizations: declines    Denies smoking or illicit drug use, drinks no alcoholic beverages a week. Patient reports routine vision checks and dental cleanings, and regular exercise.    Screening colonoscopy N/A. Screening pap N/A. Screening mammogram N/A. DEXA done 7/023.     Hearing screen: reports no difficulty with hearing    Does the patient use opioid medications: No    How does the patient rate their health status today: excellent    Cognitive Screen:  AAAx3  to person, place and time: Yes  3 word recall: Apple, Car, Shoe   - Immediate recall: Yes    - 5 minutes recall: Yes   Impression: No cognitive deficiency observed during screening or encounter today      Reviewed:   Past Medical History/Allergies:  Yes  Family History:  Yes  Social History:  Yes  Current Medications:  Yes  Vital Signs:  Yes  Immunizations:  reviewed today  Home Safety:                    Up & Go test > 30 seconds?  No                   Home have rugs; lack grab bars in bathroom; lack handrail on stairs; have poor lighting?  No                   Hearing difficulties?  No  Geriatric Assessment                   ADL areas requiring assistance:  Does not need help with Dressing, Eating, Ambulating, Toileting, Grooming, Hygiene.                    IADL areas requiring assistance:  Does not need help with Shopping, Housework, Accounting, Transportation, Driving.   Medications: reviewed  Current supplements:  Reviewed and recorded.   Other providers: Reviewed and recorded     Patient Health Questionnaire-2 Score: 0 (5/13/2025  1:29 PM)           Past Medical History:   Diagnosis Date    Other specified health status     No known health problems        Current Medications  Current Outpatient Medications   Medication Instructions    amoxicillin-clavulanate (Augmentin) 875-125 mg tablet 875 mg, oral, 2 times daily    calcium carbonate-vitamin D3 (Calcium 600 + D,3,) 600 mg-10 mcg (400 unit) tablet Every 12 hours    ferrous sulfate 325 mg, Daily with breakfast    gabapentin (NEURONTIN) 100 mg, oral, 2 times daily    loratadine (CLARITIN) 10 mg, oral, Daily    multivitamin with minerals iron-free (multivit-iron-minerals-folic acid) 1 tablet, Daily        Allergies  Allergies   Allergen Reactions    Aspirin GI Upset and Other    Erythromycin Unknown        Immunizations  Immunization History   Administered Date(s) Administered    COVID-19, mRNA, LNP-S, PF, 30 mcg/0.3 mL dose 03/06/2021, 03/27/2021, 12/11/2021    Pfizer Gray Cap SARS-CoV-2 07/12/2022        Past Surgical History:   Procedure Laterality Date    OTHER SURGICAL HISTORY  10/07/2021    Cholecystectomy    OTHER SURGICAL HISTORY  04/13/2021    Gallbladder surgery     No family history on file.  Social History     Tobacco Use    Smoking status: Never     Passive exposure: Never    Smokeless tobacco: Never   Vaping Use    Vaping status: Never Used   Substance Use Topics    Alcohol use: Never    Drug use: Never     Tobacco Use: Low Risk  (5/13/2025)    Patient History     Smoking Tobacco Use: Never     Smokeless Tobacco Use: Never     Passive Exposure: Never        ROS  All pertinent positive symptoms are included in the history of present illness.  All other systems have been reviewed and are negative and noncontributory to this patient's current ailments.    VITAL SIGNS  Vitals:    05/13/25 1326   BP: 114/70   Pulse: 91   Temp: 35.8 °C (96.4 °F)   SpO2: 95%     Vitals:    05/13/25 1326   Weight: 54.4 kg (120 lb)      Body mass index is 21.26 kg/m².     PHYSICAL EXAM  GENERAL APPEARANCE: well  nourished, well developed, looks like stated age, in no acute distress, not ill or tired appearing, conversing well.   HEENT: no trauma, normocephalic. PERRLA and EOMI with normal external exam. TM's intact with no injection or effusion, no signs of infection. Nares patent, turbinates pink without discharge. Pharynx pink with no exudates or lesions, no enlarged tonsils.   NECK: no nodes, supple without rigidity, no neck mass was observed, no thyromegaly or thyroid nodules.   HEART: regular rate and rhythm, S1 and S2 heard with no murmurs or skipped beats, no carotid bruits.   LUNGS: clear to auscultation bilaterally with no wheezes, crackles or rales.   ABDOMEN: no organomegaly, soft, nontender, nondistended, normal bowel sounds, no guarding/rebound/rigidity.   EXTREMITIES: moving all extremities equally with no edema or deformities.   SKIN: normal skin color and pigmentation, normal skin turgor without rash, lesions, or nodules visualized.   NEUROLOGIC EXAM: CN II-XII grossly intact, normal gait, normal balance, 5/5 muscle strength, sensation grossly intact.   PSYCH: mood and affect appropriate; alert and oriented to time, place, person; no difficulty with speech or language.   LYMPH NODES: no cervical lymphadenopathy.         Assessment/Plan   Diagnoses and all orders for this visit:  Adult general medical exam  Seasonal allergies  -     loratadine (Claritin) 10 mg tablet; Take 1 tablet (10 mg) by mouth once daily.  Acute non-recurrent maxillary sinusitis  -     amoxicillin-clavulanate (Augmentin) 875-125 mg tablet; Take 1 tablet (875 mg) by mouth 2 times a day for 7 days.  Mixed hyperlipidemia  -     Lipid Panel; Future  -     Comprehensive Metabolic Panel; Future  -     TSH with reflex to Free T4 if abnormal; Future  Vitamin D deficiency  -     Vitamin D 25-Hydroxy,Total (for eval of Vitamin D levels); Future  Chronic gastric ulcer with hemorrhage  -     CBC; Future      This was a shared decision making  visit.    Next Wellness Exam Due  In 1 year from today      Philippe Aldana MD   05/14/25   8:40 AM

## 2025-05-19 ENCOUNTER — TELEPHONE (OUTPATIENT)
Dept: PRIMARY CARE | Facility: CLINIC | Age: 85
End: 2025-05-19
Payer: MEDICARE

## 2025-05-19 NOTE — TELEPHONE ENCOUNTER
Per lab results form Dr Aldana       CBC is normal     Cholesterol is normal     Labs are steven;    Pt was notified.

## 2025-05-19 NOTE — TELEPHONE ENCOUNTER
----- Message from Philippe Aldana sent at 5/18/2025 11:58 PM EDT -----  Thyroid is normal  ----- Message -----  From: Hailee Colon MA  Sent: 5/16/2025  11:00 AM EDT  To: Philippe Aldana MD

## 2025-05-22 DIAGNOSIS — R20.2 PARESTHESIA OF SKIN: ICD-10-CM

## 2025-05-22 RX ORDER — GABAPENTIN 100 MG/1
100 CAPSULE ORAL 2 TIMES DAILY
Qty: 180 CAPSULE | Refills: 3 | Status: SHIPPED | OUTPATIENT
Start: 2025-05-22

## 2025-05-22 NOTE — TELEPHONE ENCOUNTER
Requesting refill on populated rx - last OV 5/13/2025.  She switched pharmacies and needs it to be resent to her new pharmacy.

## 2025-06-09 ENCOUNTER — APPOINTMENT (OUTPATIENT)
Dept: OTOLARYNGOLOGY | Facility: CLINIC | Age: 85
End: 2025-06-09
Payer: MEDICARE

## 2025-06-09 VITALS — BODY MASS INDEX: 21.26 KG/M2 | HEIGHT: 63 IN | WEIGHT: 120 LBS

## 2025-06-09 DIAGNOSIS — H93.8X3 SENSATION OF FULLNESS IN BOTH EARS: ICD-10-CM

## 2025-06-09 DIAGNOSIS — H90.0 CONDUCTIVE HEARING LOSS, BILATERAL: ICD-10-CM

## 2025-06-09 DIAGNOSIS — R04.0 EPISTAXIS: ICD-10-CM

## 2025-06-09 DIAGNOSIS — H61.23 BILATERAL IMPACTED CERUMEN: Primary | ICD-10-CM

## 2025-06-09 PROCEDURE — 99214 OFFICE O/P EST MOD 30 MIN: CPT | Performed by: OTOLARYNGOLOGY

## 2025-06-09 PROCEDURE — 1159F MED LIST DOCD IN RCRD: CPT | Performed by: OTOLARYNGOLOGY

## 2025-06-09 PROCEDURE — 1036F TOBACCO NON-USER: CPT | Performed by: OTOLARYNGOLOGY

## 2025-06-09 PROCEDURE — 69210 REMOVE IMPACTED EAR WAX UNI: CPT | Performed by: OTOLARYNGOLOGY

## 2025-06-09 NOTE — PROGRESS NOTES
"Chief Complaint   Patient presents with    Cerumen Impaction     LOV: 2/2025 EAR CLEANING, H/O NOSEBLEEDS     HPI:  Naomi Richardson is a 85 y.o. female ([employer]) who complains of blocked ears.  Ongoing for at least several weeks.  Does have a history of wax impactions.  Denies any pain, drainage, vertigo.  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  Doing well.  No recurrence of any epistaxis since significant episode back in February which required nasal packing and ultimately cauterization in my office by the nurse practitioner.    PMH:  Past Medical History:   Diagnosis Date    Other specified health status     No known health problems     Past Surgical History:   Procedure Laterality Date    OTHER SURGICAL HISTORY  10/07/2021    Cholecystectomy    OTHER SURGICAL HISTORY  04/13/2021    Gallbladder surgery         Medications:     Current Outpatient Medications:     calcium carbonate-vitamin D3 (Calcium 600 + D,3,) 600 mg-10 mcg (400 unit) tablet, every 12 hours., Disp: , Rfl:     ferrous sulfate, 325 mg ferrous sulfate, tablet, Take 1 tablet by mouth once daily with breakfast., Disp: , Rfl:     gabapentin (Neurontin) 100 mg capsule, Take 1 capsule (100 mg) by mouth 2 times a day., Disp: 180 capsule, Rfl: 3    loratadine (Claritin) 10 mg tablet, Take 1 tablet (10 mg) by mouth once daily., Disp: 30 tablet, Rfl: 2    multivitamin with minerals iron-free (multivit-iron-minerals-folic acid), Take 1 tablet by mouth once daily., Disp: , Rfl:      Allergies:  Allergies   Allergen Reactions    Aspirin GI Upset and Other    Erythromycin Unknown        ROS:  Review of systems normal unless stated otherwise in the HPI and/or PMH.    Physical Exam:  Height 1.6 m (5' 3\"), weight 54.4 kg (120 lb). Body mass index is 21.26 kg/m².     GENERAL APPEARANCE: Well developed and well nourished.  Alert and oriented in no acute distress.  Normal vocal quality.      HEAD/FACE: No erythema or edema or facial tenderness.  " Normal facial nerve function bilaterally.    EAR:       EXTERNAL: Normal pinnas and bilateral obstructive cerumen impaction was removed by myself with instrumentation using the operating microscope.  Normal pinnas and external auditory canals after cleaning.       MIDDLE EAR: Tympanic membranes intact and mobile with normal landmarks.  Middle ear space appears well aerated.       TUBE STATUS: N/A       MASTOID CAVITY: N/A       HEARING: Gross hearing assessment is within normal limits.      NOSE:       VISUALIZED USING: Anterior rhinoscopy with headlight and nasal speculum.       DORSUM: Midline, nontraumatic appearance.       MUCOSA: Normal-appearing.       SECRETIONS: Normal.       SEPTUM: Midline and nonobstructing.       INFERIOR TURBINATES: Normal.       MIDDLE TURBINATES/MEATUS: N/A       BLEEDING: N/A         ORAL CAVITY/PHARYNX:       TEETH: Adequate dentition.       TONGUE: No mass or lesion.  Normal mobility.       FLOOR OF MOUTH: No mass or lesion.       PALATE: Normal hard palate, soft palate, and uvula.       OROPHARYNX: Normal without mass or lesion.       BUCCAL MUCOSA/GBS: Normal without mass or lesion.       LIPS: Normal.    LARYNX/HYPOPHARYNX/NASOPHARYNX: N/A    NECK: No palpable masses or abnormal adenopathy.  Trachea is midline.    THYROID: No thyromegaly or palpable nodule.    SALIVARY GLANDS: Normal bilateral parotid and submandibular glands by inspection and palpation.    TMJ's: Normal.    NEURO: Cranial nerve exam grossly normal bilaterally.       Assessment/Plan   Naomi was seen today for cerumen impaction.  Diagnoses and all orders for this visit:  Bilateral impacted cerumen (Primary)  Conductive hearing loss, bilateral  Sensation of fullness in both ears  Epistaxis     Hearing better post ear cleaning.  Follow-up with changes.  No further epistaxis.  Follow up if symptoms worsen or fail to improve.     Galileo Miguel MD

## 2025-07-08 ENCOUNTER — APPOINTMENT (OUTPATIENT)
Dept: OTOLARYNGOLOGY | Facility: CLINIC | Age: 85
End: 2025-07-08
Payer: MEDICARE